# Patient Record
Sex: MALE | Race: ASIAN | NOT HISPANIC OR LATINO
[De-identification: names, ages, dates, MRNs, and addresses within clinical notes are randomized per-mention and may not be internally consistent; named-entity substitution may affect disease eponyms.]

---

## 2022-05-31 ENCOUNTER — NON-APPOINTMENT (OUTPATIENT)
Age: 72
End: 2022-05-31

## 2022-05-31 ENCOUNTER — APPOINTMENT (OUTPATIENT)
Dept: HEART AND VASCULAR | Facility: CLINIC | Age: 72
End: 2022-05-31
Payer: MEDICARE

## 2022-05-31 VITALS
HEIGHT: 63.5 IN | DIASTOLIC BLOOD PRESSURE: 70 MMHG | SYSTOLIC BLOOD PRESSURE: 124 MMHG | TEMPERATURE: 97.4 F | BODY MASS INDEX: 21.58 KG/M2 | OXYGEN SATURATION: 95 % | HEART RATE: 85 BPM | WEIGHT: 123.31 LBS

## 2022-05-31 DIAGNOSIS — E11.9 TYPE 2 DIABETES MELLITUS W/OUT COMPLICATIONS: ICD-10-CM

## 2022-05-31 DIAGNOSIS — Z79.4 TYPE 2 DIABETES MELLITUS W/OUT COMPLICATIONS: ICD-10-CM

## 2022-05-31 DIAGNOSIS — R94.39 ABNORMAL RESULT OF OTHER CARDIOVASCULAR FUNCTION STUDY: ICD-10-CM

## 2022-05-31 DIAGNOSIS — I25.10 ATHEROSCLEROTIC HEART DISEASE OF NATIVE CORONARY ARTERY W/OUT ANGINA PECTORIS: ICD-10-CM

## 2022-05-31 DIAGNOSIS — E78.5 HYPERLIPIDEMIA, UNSPECIFIED: ICD-10-CM

## 2022-05-31 DIAGNOSIS — I10 ESSENTIAL (PRIMARY) HYPERTENSION: ICD-10-CM

## 2022-05-31 PROBLEM — Z00.00 ENCOUNTER FOR PREVENTIVE HEALTH EXAMINATION: Status: ACTIVE | Noted: 2022-05-31

## 2022-05-31 PROCEDURE — 93000 ELECTROCARDIOGRAM COMPLETE: CPT

## 2022-05-31 PROCEDURE — 36415 COLL VENOUS BLD VENIPUNCTURE: CPT

## 2022-05-31 RX ORDER — ATORVASTATIN CALCIUM 80 MG/1
80 TABLET, FILM COATED ORAL
Refills: 0 | Status: ACTIVE | COMMUNITY
Start: 2022-03-02

## 2022-05-31 RX ORDER — METFORMIN HYDROCHLORIDE 1000 MG/1
1000 TABLET, COATED ORAL
Qty: 180 | Refills: 0 | Status: ACTIVE | COMMUNITY
Start: 2022-03-02

## 2022-05-31 RX ORDER — EMPAGLIFLOZIN 25 MG/1
25 TABLET, FILM COATED ORAL
Qty: 90 | Refills: 0 | Status: ACTIVE | COMMUNITY
Start: 2022-03-02

## 2022-05-31 RX ORDER — ISOSORBIDE MONONITRATE 30 MG/1
30 TABLET, EXTENDED RELEASE ORAL
Qty: 90 | Refills: 0 | Status: ACTIVE | COMMUNITY
Start: 2022-03-10

## 2022-05-31 RX ORDER — EZETIMIBE 10 MG/1
10 TABLET ORAL
Qty: 90 | Refills: 3 | Status: ACTIVE | COMMUNITY
Start: 2022-05-31 | End: 1900-01-01

## 2022-05-31 RX ORDER — CLOPIDOGREL BISULFATE 75 MG/1
75 TABLET, FILM COATED ORAL
Qty: 90 | Refills: 0 | Status: ACTIVE | COMMUNITY
Start: 2022-03-02

## 2022-05-31 RX ORDER — OLMESARTAN MEDOXOMIL 40 MG/1
40 TABLET, FILM COATED ORAL
Refills: 0 | Status: ACTIVE | COMMUNITY

## 2022-05-31 RX ORDER — PEN NEEDLE, DIABETIC 29 G X1/2"
32G X 4 MM NEEDLE, DISPOSABLE MISCELLANEOUS
Qty: 360 | Refills: 0 | Status: ACTIVE | COMMUNITY
Start: 2021-12-20

## 2022-05-31 RX ORDER — INSULIN LISPRO 100 [IU]/ML
100 INJECTION, SOLUTION INTRAVENOUS; SUBCUTANEOUS
Refills: 0 | Status: ACTIVE | COMMUNITY

## 2022-05-31 RX ORDER — TRAVOPROST 0.04 MG/ML
0 SOLUTION OPHTHALMIC
Qty: 7 | Refills: 0 | Status: ACTIVE | COMMUNITY
Start: 2021-12-17

## 2022-05-31 RX ORDER — INSULIN GLARGINE 300 U/ML
300 INJECTION, SOLUTION SUBCUTANEOUS
Qty: 4 | Refills: 0 | Status: ACTIVE | COMMUNITY
Start: 2021-12-20

## 2022-05-31 RX ORDER — CARVEDILOL 25 MG/1
25 TABLET, FILM COATED ORAL TWICE DAILY
Qty: 90 | Refills: 2 | Status: ACTIVE | COMMUNITY

## 2022-05-31 RX ORDER — FENOFIBRIC ACID 135 MG/1
135 CAPSULE, DELAYED RELEASE ORAL
Qty: 90 | Refills: 0 | Status: ACTIVE | COMMUNITY
Start: 2021-11-17

## 2022-05-31 RX ORDER — BLOOD SUGAR DIAGNOSTIC
STRIP MISCELLANEOUS
Qty: 400 | Refills: 0 | Status: ACTIVE | COMMUNITY
Start: 2021-12-20

## 2022-05-31 NOTE — PHYSICAL EXAM
[Normal] : soft, non-tender, no masses/organomegaly, normal bowel sounds [No Edema] : no edema [Moves all extremities] : moves all extremities

## 2022-06-01 NOTE — HISTORY OF PRESENT ILLNESS
[FreeTextEntry1] : 71M w/ CAD (multivessel small vessl dz,  PDA), DM2 (dx age 40), HTN (but labile), mixed dyslipidemia with here for cardiovascular consult\par \par His primary residence is in [place] but now has been in [facility] since. Was seeing Dr Contreras at [facility]. His son in law in a patient of Dr Elam. \par \par He had a positive stress test in March 2022 and he was recommended medical management for 3 months and lifestyle management and then reassess if angiogram is indicated. He was started on isosorbide. \par He has a history of inferior ischemia noted on nuclear stress going back to 2006 at least. \par \par He walks 4-5000 steps a day. He is able to do 1 flight of stairs without dyspnea or chest pain. If he walks 4 flights he has some discomfort with breathing -- but he wears a N95 mask. He has been hesitant with more exercise due to concern about crowds and COVID. \par Patient denies any chest pain, SOB, palpitations, orthopnea, PND or LE edema.\par \par \par He does report lightheadedness on occasion, usually soon after taking his medications. But this is not frequent or bothersome. There is no associated syncope. It may occur with walking, and with change in position. \par \par Social Hx: Never smoker, no alcohol\par Family Hx: No premature ASCVD in first degree relative, no known sudden cardiac death\par \par Lifestyle History:\par Mediterranean Diet Score (9 question survey) was 4. \par (8-9: optimal, 6-7: near-optimal, 4-5: suboptimal, 0-3: markedly suboptimal)\par Exercise: Patient reports exercising at a moderate level for 120 minutes per week. \par Smoking: Patient denies any history of smoking. \par Stress: Moderate  stress. \par Sleep apnea: Some fatigue but no other features\par \par ======Data=====\par Labs 12/2021: Cr 0.87, K 4.4, LFt wnl. \par 5/2021: Tchol 147, , HDL 42, LDL 84, \par 3/2022: A1c 8%\par \par \par ECHO 3/2022: Nl EF 60%, Nl LV diastolic fnx, Nl RV size and fx, aortic sclerosis. nl aorta.\par \par \par Stress: Had SPECT/nuclear stress March 2022 which had positive stress ECG with 2mm ST segment depression in V4-V6. EF 73%. Achieved 10 METs and stopped for fatigue he had perfusion defects in the apical inferior, apex (mild) and mid inferior walls (moderate)\par \par Cath 2014 ([facility]): LM nl, LAD moderate dx good flow, D!D# small vessels, LCx - OM1 with 60-70% lesion and small vessel,  PDA with collateral\par \par

## 2022-06-01 NOTE — REASON FOR VISIT
[CV Risk Factors and Non-Cardiac Disease] : CV risk factors and non-cardiac disease [Hyperlipidemia] : hyperlipidemia [Hypertension] : hypertension [Coronary Artery Disease] : coronary artery disease [Symptom and Test Evaluation] : symptom and test evaluation [FreeTextEntry3] : Dr Bridget Booth, Dr Demond Room ([facility])

## 2022-06-02 LAB
ALBUMIN SERPL ELPH-MCNC: 4.7 G/DL
ALP BLD-CCNC: 37 U/L
ALT SERPL-CCNC: 14 U/L
ANION GAP SERPL CALC-SCNC: 13 MMOL/L
AST SERPL-CCNC: 20 U/L
BILIRUB SERPL-MCNC: 0.4 MG/DL
BUN SERPL-MCNC: 27 MG/DL
CALCIUM SERPL-MCNC: 9.8 MG/DL
CHLORIDE SERPL-SCNC: 100 MMOL/L
CHOLEST SERPL-MCNC: 151 MG/DL
CO2 SERPL-SCNC: 24 MMOL/L
CREAT SERPL-MCNC: 0.93 MG/DL
EGFR: 87 ML/MIN/1.73M2
GLUCOSE SERPL-MCNC: 117 MG/DL
HDLC SERPL-MCNC: 51 MG/DL
LDLC SERPL CALC-MCNC: 81 MG/DL
NONHDLC SERPL-MCNC: 100 MG/DL
POTASSIUM SERPL-SCNC: 4.5 MMOL/L
PROT SERPL-MCNC: 7.4 G/DL
SODIUM SERPL-SCNC: 137 MMOL/L
TRIGL SERPL-MCNC: 96 MG/DL

## 2022-06-08 LAB
BASOPHILS # BLD AUTO: 0.04 K/UL
BASOPHILS NFR BLD AUTO: 0.8 %
EOSINOPHIL # BLD AUTO: 0.12 K/UL
EOSINOPHIL NFR BLD AUTO: 2.3 %
HCT VFR BLD CALC: 40.6 %
HGB BLD-MCNC: 13.1 G/DL
IMM GRANULOCYTES NFR BLD AUTO: 0.2 %
LYMPHOCYTES # BLD AUTO: 1.35 K/UL
LYMPHOCYTES NFR BLD AUTO: 26.1 %
MAN DIFF?: NORMAL
MCHC RBC-ENTMCNC: 28.7 PG
MCHC RBC-ENTMCNC: 32.3 GM/DL
MCV RBC AUTO: 88.8 FL
MONOCYTES # BLD AUTO: 0.36 K/UL
MONOCYTES NFR BLD AUTO: 7 %
NEUTROPHILS # BLD AUTO: 3.29 K/UL
NEUTROPHILS NFR BLD AUTO: 63.6 %
PLATELET # BLD AUTO: 234 K/UL
RBC # BLD: 4.57 M/UL
RBC # FLD: 14.6 %
WBC # FLD AUTO: 5.17 K/UL

## 2023-12-14 NOTE — H&P ADULT - HISTORY OF PRESENT ILLNESS
Cardiologist: Dr. Degroot  Pharmacy:   Escort:    72 y/o M w/ PMH HTN, HLD, DM, mild b/l carotid stenosis who initially presented to see his cardiologist Dr. Degroot with CC of substernal chest pain, non-radiating describes it as dull ache occurring intermittently on ambulating 2 blocks or climbing 1 flight of stairs, Pt. also endorses mild dizziness. Pt. denies any ___ SOB, palpitations, N/V, LE edema, PND/orthopnea and syncope. NST 11/16/23 : moderate amount of ischemia in apical location, EF 56 %. ECHO 11/4/23 revealed EF 68 %, AV calcification, trace TR. In light of patients risk factors, CCS class __ sx and abnormal NST, pt. is now referred for cardiac cath with possible intervention.  Cardiologist: Dr. Degroot  Pharmacy:   Escort:    74 y/o M w/ PMH HTN, HLD, DM, mild b/l carotid stenosis who initially presented to see his cardiologist Dr. Degroot with CC of substernal chest pain, non-radiating describes it as dull ache occurring intermittently on ambulating 2 blocks or climbing 1 flight of stairs, Pt. also endorses mild dizziness. Pt. denies any ___ SOB, palpitations, N/V, LE edema, PND/orthopnea and syncope. NST 11/16/23 : moderate amount of ischemia in apical location, EF 56 %. ECHO 11/4/23 revealed EF 68 %, AV calcification, trace TR. In light of patients risk factors, CCS class __ sx and abnormal NST, pt. is now referred for cardiac cath with possible intervention.

## 2024-02-01 VITALS — HEIGHT: 63 IN | WEIGHT: 121.92 LBS

## 2024-02-01 RX ORDER — SODIUM CHLORIDE 9 MG/ML
1000 INJECTION, SOLUTION INTRAVENOUS
Refills: 0 | Status: DISCONTINUED | OUTPATIENT
Start: 2024-02-07 | End: 2024-02-21

## 2024-02-01 RX ORDER — DEXTROSE 50 % IN WATER 50 %
15 SYRINGE (ML) INTRAVENOUS ONCE
Refills: 0 | Status: DISCONTINUED | OUTPATIENT
Start: 2024-02-07 | End: 2024-02-21

## 2024-02-01 RX ORDER — DEXTROSE 50 % IN WATER 50 %
25 SYRINGE (ML) INTRAVENOUS ONCE
Refills: 0 | Status: DISCONTINUED | OUTPATIENT
Start: 2024-02-07 | End: 2024-02-21

## 2024-02-01 RX ORDER — CHLORHEXIDINE GLUCONATE 213 G/1000ML
1 SOLUTION TOPICAL ONCE
Refills: 0 | Status: DISCONTINUED | OUTPATIENT
Start: 2024-02-07 | End: 2024-02-21

## 2024-02-01 RX ORDER — SODIUM CHLORIDE 9 MG/ML
1000 INJECTION INTRAMUSCULAR; INTRAVENOUS; SUBCUTANEOUS
Refills: 0 | Status: DISCONTINUED | OUTPATIENT
Start: 2024-02-07 | End: 2024-02-21

## 2024-02-01 RX ORDER — GLUCAGON INJECTION, SOLUTION 0.5 MG/.1ML
1 INJECTION, SOLUTION SUBCUTANEOUS ONCE
Refills: 0 | Status: DISCONTINUED | OUTPATIENT
Start: 2024-02-07 | End: 2024-02-21

## 2024-02-01 RX ORDER — DEXTROSE 50 % IN WATER 50 %
12.5 SYRINGE (ML) INTRAVENOUS ONCE
Refills: 0 | Status: DISCONTINUED | OUTPATIENT
Start: 2024-02-07 | End: 2024-02-21

## 2024-02-01 RX ORDER — INSULIN LISPRO 100/ML
VIAL (ML) SUBCUTANEOUS
Refills: 0 | Status: DISCONTINUED | OUTPATIENT
Start: 2024-02-07 | End: 2024-02-21

## 2024-02-01 NOTE — H&P ADULT - NSVTERISKREFERASSESS_GEN_ALL_CORE
Monitor on telemetry for now    - Daily weights, strict I/O's  - Check BNP in 48 hours   - s/p Lasix 20mg IVP x1    - TTE ordered: EF 25% Refer to the Assessment tab to view/cancel completed assessment.

## 2024-02-01 NOTE — H&P ADULT - NSHPLABSRESULTS_GEN_ALL_CORE
LABS:                          14.7   4.78  )-----------( 208      ( 07 Feb 2024 08:47 )             44.8     02-07    139  |  101  |  28<H>  ----------------------------<  105<H>  3.8   |  24  |  0.79    Ca    9.3      07 Feb 2024 09:23  Mg     2.1     02-07    TPro  7.5  /  Alb  4.7  /  TBili  0.6  /  DBili  x   /  AST  35  /  ALT  34  /  AlkPhos  53  02-07    LIVER FUNCTIONS - ( 07 Feb 2024 09:23 )  Alb: 4.7 g/dL / Pro: 7.5 g/dL / ALK PHOS: 53 U/L / ALT: 34 U/L / AST: 35 U/L / GGT: x           PT/INR - ( 07 Feb 2024 08:47 )   PT: 11.0 sec;   INR: 0.96          PTT - ( 07 Feb 2024 08:47 )  PTT:38.6 sec  Urinalysis Basic - ( 07 Feb 2024 09:23 )    Color: x / Appearance: x / SG: x / pH: x  Gluc: 105 mg/dL / Ketone: x  / Bili: x / Urobili: x   Blood: x / Protein: x / Nitrite: x   Leuk Esterase: x / RBC: x / WBC x   Sq Epi: x / Non Sq Epi: x / Bacteria: x

## 2024-02-01 NOTE — H&P ADULT - HISTORY OF PRESENT ILLNESS
Cardiologist: Dr. Glory Degroot;  per [facility] post C ppwk pt was advised to f/up with Dr. Jake Contreras ([phone])   Pharmacy:   Escort:    72 y/o M w/ PMH HTN, HLD, DM, mild b/l carotid stenosis who initially presented to see his cardiologist Dr. Degroot with CC of substernal chest pain, non-radiating describes it as dull ache, associated with LITTLE/SOB, occurring intermittently on ambulating 2 blocks or climbing 1 flight of stairs, Pt. also endorses mild dizziness.  Pt recently underwent diagnostic LHC at [facility] and was found to have total occlusion of RPDA and mLAD (full report below); pt was advised on medical therapy, with options of PCI or bypass.   Pt _______ chest pain, SOB, LITTLE,  palpitations, diaphoresis, orthopnea/PND, cough, leg swelling, dizziness, LOC, fall, syncope, N/V/D, fever/chills/sick contact, rash, hematuria, BRBPR, melena/hematochezia, PMHx bleeding or clotting disorder.    LHC at [facility] 12/13/23: RCA mild diffuse dz; RPDA , small size, fills via collaterals from LAD; RPL1 Severe diffuse dz;   LM moderately calcified but no obtruction;  LAD: prox- mild diffuse; mLAD ; D1, D2, D3: mild diffuse; LCx mild diffuse; OM1 moderate diffuse disease, small size.  LVEDP normal. Rt radial access.   NST 11/16/23: moderate ischemia in apical location which is reversible   TTE 11/4/23: revealed EF 68 %, AV calcification, trace TR.     In light of patient's risk factors, abnormal NST and LHC results, and CCS class ____ anginal/anginal-equivalent symptoms, patient is referred to [facility] for cardiac catheterization w/ possible intervention if clinically indicated.   Cardiologist: Dr. Glory Degroot;  per [facility] post C ppwk pt was advised to f/up with Dr. Jake Contreras ([phone])   Pharmacy:   Escort:    72 y/o M w/ PMH HTN, HLD, DM-2, mild b/l carotid stenosis who initially presented to see his cardiologist Dr. Degroot with CC of substernal chest pain, non-radiating describes it as dull ache, associated with LITTLE/SOB, occurring intermittently on ambulating 2 blocks or climbing 1 flight of stairs, Pt. also endorses mild dizziness.  Pt recently underwent diagnostic LHC at [facility] and was found to have total occlusion of RPDA and mLAD (full report below); pt was advised on medical therapy, with options of PCI or bypass.   Pt _______ chest pain, SOB, LITTLE,  palpitations, diaphoresis, orthopnea/PND, cough, leg swelling, dizziness, LOC, fall, syncope, N/V/D, fever/chills/sick contact, rash, hematuria, BRBPR, melena/hematochezia, PMHx bleeding or clotting disorder.    LHC at [facility] 12/13/23: RCA mild diffuse dz; RPDA , small size, fills via collaterals from LAD; RPL1 Severe diffuse dz;   LM moderately calcified but no obtruction;  LAD: prox- mild diffuse; mLAD ; D1, D2, D3: mild diffuse; LCx mild diffuse; OM1 moderate diffuse disease, small size.  LVEDP normal. Rt radial access.   NST 11/16/23: moderate ischemia in apical location which is reversible   TTE 11/4/23: revealed EF 68 %, AV calcification, trace TR.     In light of patient's risk factors, abnormal NST and LHC results, and CCS class ____ anginal/anginal-equivalent symptoms, patient is referred to [facility] for cardiac catheterization w/ possible intervention if clinically indicated.   Cardiologist: Dr. Glory Degroot, Dr Marciano Ayala  Pharmacy: [facility]  Escort: daughter Mirella    74 y/o M w/ PMH HTN, HLD, DM-2, mild b/l carotid stenosis who initially presented to see his cardiologist Dr. Degroot with CC of substernal chest pain, non-radiating describes it as dull ache, associated with LITTLE/SOB, occurring intermittently on ambulating 2 blocks or climbing 1 flight of stairs, Pt. also endorses mild dizziness.  Pt recently underwent diagnostic LHC at [facility] and was found to have total occlusion of RPDA and mLAD (full report below); pt was advised on medical therapy, with options of PCI or bypass, and chose PCI.    Pt otherwise denies chest pain, SOB, LITTLE,  palpitations, diaphoresis, orthopnea/PND, cough, leg swelling, dizziness, LOC, fall, syncope, N/V/D, fever/chills/sick contact, rash, hematuria, BRBPR, melena/hematochezia, PMHx bleeding or clotting disorder.    LHC at [facility] 12/13/23: RCA mild diffuse dz; RPDA , small size, fills via collaterals from LAD; RPL1 Severe diffuse dz;   LM moderately calcified but no obtruction;  LAD: prox- mild diffuse; mLAD ; D1, D2, D3: mild diffuse; LCx mild diffuse; OM1 moderate diffuse disease, small size.  LVEDP normal. Rt radial access.   NST 11/16/23: moderate ischemia in apical location which is reversible   TTE 11/4/23: revealed EF 68 %, AV calcification, trace TR.     In light of patient's risk factors, abnormal NST and LHC results, and CCS class III anginal symptoms, patient is referred to [facility] for cardiac catheterization w/ possible intervention if clinically indicated.

## 2024-02-01 NOTE — H&P ADULT - ASSESSMENT
72 y/o M w/ PMH HTN, HLD, DM-2, mild b/l carotid stenosis who initially presented to see his cardiologist Dr. Degroot with CC of substernal chest pain, non-radiating describes it as dull ache, associated with LITTLE/SOB, occurring intermittently on ambulating 2 blocks or climbing 1 flight of stairs, Pt. also endorses mild dizziness.  Pt recently underwent diagnostic LHC at [facility] and was found to have total occlusion of RPDA and mLAD (full report below); pt was advised on medical therapy, with options of PCI or bypass, and chose PCI. In light of patient's risk factors, abnormal NST and LHC results, and CCS class III anginal symptoms, patient is referred to [facility] for cardiac catheterization w/ possible intervention if clinically indicated.    - ASA III Mallampati II  - VSS  - Patient is a candidate for moderate sedation  - Labs reviewed by PA - _________  - GFR/Cr ____  - EKG - NSR 85 bpm, no Qw/ST changes/TWI  - EF - 68% per TTE  - LOAD - missed 2 doses of ASA and Plavix, ordered for  mg and Plavix 150 mg  - FLUIDS -  ml bolus given x1; NS 75 ml/hr given x 2hr    Risks & benefits of procedure and alternative therapy have been explained to the patient including but not limited to: allergic reaction, bleeding w/possible need for blood transfusion, infection, renal and vascular compromise, limb damage, arrhythmia, stroke, vessel dissection/perforation, Myocardial infarction, emergent CABG. Informed consent obtained and in chart.    72 y/o M w/ PMH HTN, HLD, DM-2, mild b/l carotid stenosis who initially presented to see his cardiologist Dr. Degroot with CC of substernal chest pain, non-radiating describes it as dull ache, associated with LITTLE/SOB, occurring intermittently on ambulating 2 blocks or climbing 1 flight of stairs, Pt. also endorses mild dizziness.  Pt recently underwent diagnostic LHC at [facility] and was found to have total occlusion of RPDA and mLAD (full report below); pt was advised on medical therapy, with options of PCI or bypass, and chose PCI. In light of patient's risk factors, abnormal NST and LHC results, and CCS class III anginal symptoms, patient is referred to [facility] for cardiac catheterization w/ possible intervention if clinically indicated.    - ASA III Mallampati II  - VSS  - Patient is a candidate for moderate sedation  - Labs reviewed by PA - BUN:Cr ratio elevated, patient likely dehydrated i/s/o NPO; given 250 cc bolus and 75 cc x2 hrs, plan to further hydrate post cath  - GFR/Cr - 93/0.79  - EKG - NSR 85 bpm, no Qw/ST changes/TWI  - EF - 68% per TTE  - LOAD - missed 2 doses of ASA and Plavix, ordered for  mg and Plavix 150 mg  - FLUIDS -  ml bolus given x1; NS 75 ml/hr given x 2hr    Risks & benefits of procedure and alternative therapy have been explained to the patient including but not limited to: allergic reaction, bleeding w/possible need for blood transfusion, infection, renal and vascular compromise, limb damage, arrhythmia, stroke, vessel dissection/perforation, Myocardial infarction, emergent CABG. Informed consent obtained and in chart.

## 2024-02-07 ENCOUNTER — OUTPATIENT (OUTPATIENT)
Dept: OUTPATIENT SERVICES | Facility: HOSPITAL | Age: 74
LOS: 1 days | Discharge: HOME | End: 2024-02-07
Payer: MEDICARE

## 2024-02-07 LAB
A1C WITH ESTIMATED AVERAGE GLUCOSE RESULT: 6.9 % — HIGH (ref 4–5.6)
ALBUMIN SERPL ELPH-MCNC: 4.7 G/DL — SIGNIFICANT CHANGE UP (ref 3.3–5)
ALP SERPL-CCNC: 53 U/L — SIGNIFICANT CHANGE UP (ref 40–120)
ALT FLD-CCNC: 34 U/L — SIGNIFICANT CHANGE UP (ref 10–45)
ANION GAP SERPL CALC-SCNC: 10 MMOL/L — SIGNIFICANT CHANGE UP (ref 5–17)
ANION GAP SERPL CALC-SCNC: 14 MMOL/L — SIGNIFICANT CHANGE UP (ref 5–17)
APTT BLD: 38.6 SEC — HIGH (ref 24.5–35.6)
AST SERPL-CCNC: 35 U/L — SIGNIFICANT CHANGE UP (ref 10–40)
BASOPHILS # BLD AUTO: 0.06 K/UL — SIGNIFICANT CHANGE UP (ref 0–0.2)
BASOPHILS NFR BLD AUTO: 1.3 % — SIGNIFICANT CHANGE UP (ref 0–2)
BILIRUB SERPL-MCNC: 0.6 MG/DL — SIGNIFICANT CHANGE UP (ref 0.2–1.2)
BUN SERPL-MCNC: 23 MG/DL — SIGNIFICANT CHANGE UP (ref 7–23)
BUN SERPL-MCNC: 28 MG/DL — HIGH (ref 7–23)
CALCIUM SERPL-MCNC: 8.5 MG/DL — SIGNIFICANT CHANGE UP (ref 8.4–10.5)
CALCIUM SERPL-MCNC: 9.3 MG/DL — SIGNIFICANT CHANGE UP (ref 8.4–10.5)
CHLORIDE SERPL-SCNC: 101 MMOL/L — SIGNIFICANT CHANGE UP (ref 96–108)
CHLORIDE SERPL-SCNC: 103 MMOL/L — SIGNIFICANT CHANGE UP (ref 96–108)
CHOLEST SERPL-MCNC: 126 MG/DL — SIGNIFICANT CHANGE UP
CK MB CFR SERPL CALC: 3.4 NG/ML — SIGNIFICANT CHANGE UP (ref 0–6.7)
CK SERPL-CCNC: 157 U/L — SIGNIFICANT CHANGE UP (ref 30–200)
CO2 SERPL-SCNC: 24 MMOL/L — SIGNIFICANT CHANGE UP (ref 22–31)
CO2 SERPL-SCNC: 24 MMOL/L — SIGNIFICANT CHANGE UP (ref 22–31)
CREAT SERPL-MCNC: 0.79 MG/DL — SIGNIFICANT CHANGE UP (ref 0.5–1.3)
CREAT SERPL-MCNC: 0.81 MG/DL — SIGNIFICANT CHANGE UP (ref 0.5–1.3)
EGFR: 92 ML/MIN/1.73M2 — SIGNIFICANT CHANGE UP
EGFR: 93 ML/MIN/1.73M2 — SIGNIFICANT CHANGE UP
EOSINOPHIL # BLD AUTO: 0.48 K/UL — SIGNIFICANT CHANGE UP (ref 0–0.5)
EOSINOPHIL NFR BLD AUTO: 10 % — HIGH (ref 0–6)
ESTIMATED AVERAGE GLUCOSE: 151 MG/DL — HIGH (ref 68–114)
GLUCOSE BLDC GLUCOMTR-MCNC: 73 MG/DL — SIGNIFICANT CHANGE UP (ref 70–99)
GLUCOSE BLDC GLUCOMTR-MCNC: 87 MG/DL — SIGNIFICANT CHANGE UP (ref 70–99)
GLUCOSE SERPL-MCNC: 105 MG/DL — HIGH (ref 70–99)
GLUCOSE SERPL-MCNC: 260 MG/DL — HIGH (ref 70–99)
HCT VFR BLD CALC: 39.7 % — SIGNIFICANT CHANGE UP (ref 39–50)
HCT VFR BLD CALC: 44.8 % — SIGNIFICANT CHANGE UP (ref 39–50)
HDLC SERPL-MCNC: 43 MG/DL — SIGNIFICANT CHANGE UP
HGB BLD-MCNC: 13.1 G/DL — SIGNIFICANT CHANGE UP (ref 13–17)
HGB BLD-MCNC: 14.7 G/DL — SIGNIFICANT CHANGE UP (ref 13–17)
IMM GRANULOCYTES NFR BLD AUTO: 0.2 % — SIGNIFICANT CHANGE UP (ref 0–0.9)
INR BLD: 0.96 — SIGNIFICANT CHANGE UP (ref 0.85–1.18)
ISTAT ACTK (ACTIVATED CLOTTING TIME KAOLIN): 277 SEC — HIGH (ref 74–137)
ISTAT ACTK (ACTIVATED CLOTTING TIME KAOLIN): 282 SEC — HIGH (ref 74–137)
LIPID PNL WITH DIRECT LDL SERPL: 67 MG/DL — SIGNIFICANT CHANGE UP
LYMPHOCYTES # BLD AUTO: 1.17 K/UL — SIGNIFICANT CHANGE UP (ref 1–3.3)
LYMPHOCYTES # BLD AUTO: 24.5 % — SIGNIFICANT CHANGE UP (ref 13–44)
MAGNESIUM SERPL-MCNC: 2 MG/DL — SIGNIFICANT CHANGE UP (ref 1.6–2.6)
MAGNESIUM SERPL-MCNC: 2.1 MG/DL — SIGNIFICANT CHANGE UP (ref 1.6–2.6)
MCHC RBC-ENTMCNC: 28.5 PG — SIGNIFICANT CHANGE UP (ref 27–34)
MCHC RBC-ENTMCNC: 29.2 PG — SIGNIFICANT CHANGE UP (ref 27–34)
MCHC RBC-ENTMCNC: 32.8 GM/DL — SIGNIFICANT CHANGE UP (ref 32–36)
MCHC RBC-ENTMCNC: 33 GM/DL — SIGNIFICANT CHANGE UP (ref 32–36)
MCV RBC AUTO: 86.8 FL — SIGNIFICANT CHANGE UP (ref 80–100)
MCV RBC AUTO: 88.4 FL — SIGNIFICANT CHANGE UP (ref 80–100)
MONOCYTES # BLD AUTO: 0.39 K/UL — SIGNIFICANT CHANGE UP (ref 0–0.9)
MONOCYTES NFR BLD AUTO: 8.2 % — SIGNIFICANT CHANGE UP (ref 2–14)
NEUTROPHILS # BLD AUTO: 2.67 K/UL — SIGNIFICANT CHANGE UP (ref 1.8–7.4)
NEUTROPHILS NFR BLD AUTO: 55.8 % — SIGNIFICANT CHANGE UP (ref 43–77)
NON HDL CHOLESTEROL: 83 MG/DL — SIGNIFICANT CHANGE UP
NRBC # BLD: 0 /100 WBCS — SIGNIFICANT CHANGE UP (ref 0–0)
NRBC # BLD: 0 /100 WBCS — SIGNIFICANT CHANGE UP (ref 0–0)
PLATELET # BLD AUTO: 168 K/UL — SIGNIFICANT CHANGE UP (ref 150–400)
PLATELET # BLD AUTO: 208 K/UL — SIGNIFICANT CHANGE UP (ref 150–400)
POTASSIUM SERPL-MCNC: 3.8 MMOL/L — SIGNIFICANT CHANGE UP (ref 3.5–5.3)
POTASSIUM SERPL-MCNC: 4.1 MMOL/L — SIGNIFICANT CHANGE UP (ref 3.5–5.3)
POTASSIUM SERPL-SCNC: 3.8 MMOL/L — SIGNIFICANT CHANGE UP (ref 3.5–5.3)
POTASSIUM SERPL-SCNC: 4.1 MMOL/L — SIGNIFICANT CHANGE UP (ref 3.5–5.3)
PROT SERPL-MCNC: 7.5 G/DL — SIGNIFICANT CHANGE UP (ref 6–8.3)
PROTHROM AB SERPL-ACNC: 11 SEC — SIGNIFICANT CHANGE UP (ref 9.5–13)
RBC # BLD: 4.49 M/UL — SIGNIFICANT CHANGE UP (ref 4.2–5.8)
RBC # BLD: 5.16 M/UL — SIGNIFICANT CHANGE UP (ref 4.2–5.8)
RBC # FLD: 14 % — SIGNIFICANT CHANGE UP (ref 10.3–14.5)
RBC # FLD: 14.2 % — SIGNIFICANT CHANGE UP (ref 10.3–14.5)
SODIUM SERPL-SCNC: 137 MMOL/L — SIGNIFICANT CHANGE UP (ref 135–145)
SODIUM SERPL-SCNC: 139 MMOL/L — SIGNIFICANT CHANGE UP (ref 135–145)
TRIGL SERPL-MCNC: 78 MG/DL — SIGNIFICANT CHANGE UP
WBC # BLD: 3.81 K/UL — SIGNIFICANT CHANGE UP (ref 3.8–10.5)
WBC # BLD: 4.78 K/UL — SIGNIFICANT CHANGE UP (ref 3.8–10.5)
WBC # FLD AUTO: 3.81 K/UL — SIGNIFICANT CHANGE UP (ref 3.8–10.5)
WBC # FLD AUTO: 4.78 K/UL — SIGNIFICANT CHANGE UP (ref 3.8–10.5)

## 2024-02-07 PROCEDURE — C1753: CPT

## 2024-02-07 PROCEDURE — 85025 COMPLETE CBC W/AUTO DIFF WBC: CPT

## 2024-02-07 PROCEDURE — 85347 COAGULATION TIME ACTIVATED: CPT

## 2024-02-07 PROCEDURE — C1894: CPT

## 2024-02-07 PROCEDURE — 83036 HEMOGLOBIN GLYCOSYLATED A1C: CPT

## 2024-02-07 PROCEDURE — 80048 BASIC METABOLIC PNL TOTAL CA: CPT

## 2024-02-07 PROCEDURE — 85610 PROTHROMBIN TIME: CPT

## 2024-02-07 PROCEDURE — 82962 GLUCOSE BLOOD TEST: CPT

## 2024-02-07 PROCEDURE — 93010 ELECTROCARDIOGRAM REPORT: CPT

## 2024-02-07 PROCEDURE — 83735 ASSAY OF MAGNESIUM: CPT

## 2024-02-07 PROCEDURE — 99152 MOD SED SAME PHYS/QHP 5/>YRS: CPT

## 2024-02-07 PROCEDURE — 80053 COMPREHEN METABOLIC PANEL: CPT

## 2024-02-07 PROCEDURE — 85730 THROMBOPLASTIN TIME PARTIAL: CPT

## 2024-02-07 PROCEDURE — C1769: CPT

## 2024-02-07 PROCEDURE — 93005 ELECTROCARDIOGRAM TRACING: CPT

## 2024-02-07 PROCEDURE — 80061 LIPID PANEL: CPT

## 2024-02-07 PROCEDURE — 82553 CREATINE MB FRACTION: CPT

## 2024-02-07 PROCEDURE — 92920 PRQ TRLUML C ANGIOP 1ART&/BR: CPT | Mod: LD

## 2024-02-07 PROCEDURE — C1725: CPT

## 2024-02-07 PROCEDURE — 82550 ASSAY OF CK (CPK): CPT

## 2024-02-07 PROCEDURE — 85027 COMPLETE CBC AUTOMATED: CPT

## 2024-02-07 PROCEDURE — C1887: CPT

## 2024-02-07 RX ORDER — METFORMIN HYDROCHLORIDE 850 MG/1
1 TABLET ORAL
Refills: 0 | DISCHARGE

## 2024-02-07 RX ORDER — ASPIRIN/CALCIUM CARB/MAGNESIUM 324 MG
81 TABLET ORAL ONCE
Refills: 0 | Status: DISCONTINUED | OUTPATIENT
Start: 2024-02-07 | End: 2024-02-07

## 2024-02-07 RX ORDER — CLOPIDOGREL BISULFATE 75 MG/1
1 TABLET, FILM COATED ORAL
Qty: 30 | Refills: 11
Start: 2024-02-07 | End: 2025-01-31

## 2024-02-07 RX ORDER — EZETIMIBE 10 MG/1
1 TABLET ORAL
Refills: 0 | DISCHARGE

## 2024-02-07 RX ORDER — NITROGLYCERIN 6.5 MG
1 CAPSULE, EXTENDED RELEASE ORAL
Refills: 0 | DISCHARGE

## 2024-02-07 RX ORDER — SODIUM CHLORIDE 9 MG/ML
250 INJECTION INTRAMUSCULAR; INTRAVENOUS; SUBCUTANEOUS ONCE
Refills: 0 | Status: COMPLETED | OUTPATIENT
Start: 2024-02-07 | End: 2024-02-07

## 2024-02-07 RX ORDER — ASPIRIN/CALCIUM CARB/MAGNESIUM 324 MG
1 TABLET ORAL
Refills: 0 | DISCHARGE

## 2024-02-07 RX ORDER — OLMESARTAN MEDOXOMIL 5 MG/1
1 TABLET, FILM COATED ORAL
Refills: 0 | DISCHARGE

## 2024-02-07 RX ORDER — INSULIN GLARGINE 100 [IU]/ML
19 INJECTION, SOLUTION SUBCUTANEOUS
Refills: 0 | DISCHARGE

## 2024-02-07 RX ORDER — EMPAGLIFLOZIN 10 MG/1
1 TABLET, FILM COATED ORAL
Refills: 0 | DISCHARGE

## 2024-02-07 RX ORDER — CARVEDILOL PHOSPHATE 80 MG/1
1 CAPSULE, EXTENDED RELEASE ORAL
Refills: 0 | DISCHARGE

## 2024-02-07 RX ORDER — ISOSORBIDE MONONITRATE 60 MG/1
1 TABLET, EXTENDED RELEASE ORAL
Refills: 0 | DISCHARGE

## 2024-02-07 RX ORDER — ASPIRIN/CALCIUM CARB/MAGNESIUM 324 MG
162 TABLET ORAL ONCE
Refills: 0 | Status: COMPLETED | OUTPATIENT
Start: 2024-02-07 | End: 2024-02-07

## 2024-02-07 RX ORDER — SODIUM CHLORIDE 9 MG/ML
1000 INJECTION INTRAMUSCULAR; INTRAVENOUS; SUBCUTANEOUS
Refills: 0 | Status: DISCONTINUED | OUTPATIENT
Start: 2024-02-07 | End: 2024-02-21

## 2024-02-07 RX ORDER — CLOPIDOGREL BISULFATE 75 MG/1
1 TABLET, FILM COATED ORAL
Refills: 0 | DISCHARGE

## 2024-02-07 RX ORDER — ASPIRIN/CALCIUM CARB/MAGNESIUM 324 MG
1 TABLET ORAL
Qty: 30 | Refills: 11
Start: 2024-02-07 | End: 2025-01-31

## 2024-02-07 RX ORDER — CLOPIDOGREL BISULFATE 75 MG/1
150 TABLET, FILM COATED ORAL ONCE
Refills: 0 | Status: COMPLETED | OUTPATIENT
Start: 2024-02-07 | End: 2024-02-07

## 2024-02-07 RX ORDER — INSULIN GLARGINE 100 [IU]/ML
0 INJECTION, SOLUTION SUBCUTANEOUS
Refills: 0 | DISCHARGE

## 2024-02-07 RX ORDER — ATORVASTATIN CALCIUM 80 MG/1
1 TABLET, FILM COATED ORAL
Refills: 0 | DISCHARGE

## 2024-02-07 RX ORDER — CLOPIDOGREL BISULFATE 75 MG/1
75 TABLET, FILM COATED ORAL ONCE
Refills: 0 | Status: DISCONTINUED | OUTPATIENT
Start: 2024-02-07 | End: 2024-02-07

## 2024-02-07 RX ADMIN — SODIUM CHLORIDE 75 MILLILITER(S): 9 INJECTION INTRAMUSCULAR; INTRAVENOUS; SUBCUTANEOUS at 09:42

## 2024-02-07 RX ADMIN — SODIUM CHLORIDE 165 MILLILITER(S): 9 INJECTION INTRAMUSCULAR; INTRAVENOUS; SUBCUTANEOUS at 12:41

## 2024-02-07 RX ADMIN — CLOPIDOGREL BISULFATE 150 MILLIGRAM(S): 75 TABLET, FILM COATED ORAL at 09:41

## 2024-02-07 RX ADMIN — Medication 162 MILLIGRAM(S): at 09:41

## 2024-02-07 RX ADMIN — SODIUM CHLORIDE 8.33 MILLILITER(S): 9 INJECTION INTRAMUSCULAR; INTRAVENOUS; SUBCUTANEOUS at 09:42

## 2024-02-07 NOTE — PROGRESS NOTE ADULT - SUBJECTIVE AND OBJECTIVE BOX
Interventional Cardiology PA Post PCI SDA Discharge Note    Patient without complaints. Ambulated and voided without difficulties    Ext: Right Radial: no hematoma, no bleeding, dressing; C/D/I       Pulses: intact RAD/DP/PT to baseline     A/P: 72 y/o M w/ PMH HTN, HLD, known CAD (s/p LH @ [facility] 12/2023 revealed  of LAD and RPDA), DM-2, mild b/l carotid stenosis, now presented to [facility] for recommended staged PCI 2/7/24 w/     1. Follow-up with PMD/Cardiologist ___ in 72 hours.  2. Post procedure labs/EKG reviewed and stable.    3. Pt given instructions on importance of taking antiplatelet medication.    4. Stable for discharge as per attending . ___ after bed rest, pt voids, wrist stable and 30 minutes of ambulation.  5. Prescriptions for Aspirin and ___ (Plavix/Brilinta/Effient) ___ e-prescribed and submitted to patient's pharmacy.  6. Patient will continue _______.  EDP 2 s/p 500cc, Scoreflex/PTCA mLAD , LM mild diffuse, pLAD 30-50%, LCx mild diffuse, pRCA 30%, RPDA  (R-R collaterals)  Interventional Cardiology PA Post PCI SDA Discharge Note    Patient without complaints. Ambulated and voided without difficulties    Ext: Right Radial: no hematoma, no bleeding, dressing; C/D/I       Pulses: intact RAD/DP/PT to baseline     A/P: 74 y/o M w/ PMH HTN, HLD, known CAD (s/p LH @ [facility] 12/2023 revealed  of LAD and RPDA), DM-2, mild b/l carotid stenosis, now presented to [facility] for recommended staged PCI 2/7/24 w/ Scoreflex/PTCA mLAD ; LM mild diffuse, pLAD 30-50%, LCx mild diffuse, pRCA 30%, RPDA  (R-R collaterals), EDP 2, access R radial.    1. Follow-up with PMD/Cardiologist Mikayla in 72 hours.  2. Post procedure labs/EKG reviewed and stable.    3. Pt given instructions on importance of taking antiplatelet medication.    4. Stable for discharge as per attending Dr. Dumont after bed rest, pt voids, wrist stable and 30 minutes of ambulation.  5. Prescriptions for Aspirin and Plavix e-prescribed and submitted to patient's pharmacy.  6. Patient will continue Coreg ER 20mg QD, Imdur 30mg QD, Benicar 20mg QD, Lipitor 40mg HS, Zetia 10mg QD, Jardiance 25mg QD, Lantus 19u HS and Metformin 1000mg BID   Interventional Cardiology PA Post PCI SDA Discharge Note    Patient without complaints. Ambulated and voided without difficulties    Ext: Right Radial: no hematoma, no bleeding, dressing; C/D/I       Pulses: intact RAD/DP/PT to baseline     A/P: 74 y/o M w/ PMH HTN, HLD, known CAD (s/p LH @ [facility] 12/2023 revealed  of LAD and RPDA), DM-2, mild b/l carotid stenosis, now presented to [facility] for recommended staged PCI 2/7/24 w/ Scoreflex/PTCA mLAD ; LM mild diffuse, pLAD 50%, LCx mild diffuse, pRCA 30%, RPDA  (R-R collaterals), EDP 2, access R radial.    1. Follow-up with PMD/Cardiologist Mikayla in 72 hours.  2. Post procedure labs/EKG reviewed and stable.    3. Pt given instructions on importance of taking antiplatelet medication.    4. Stable for discharge as per attending Dr. Dumont after bed rest, pt voids, wrist stable and 30 minutes of ambulation.  5. Prescriptions for Aspirin and Plavix e-prescribed and submitted to patient's pharmacy.  6. Patient will continue Coreg ER 20mg QD, Imdur 30mg QD, Benicar 20mg QD, Lipitor 40mg HS, Zetia 10mg QD, Jardiance 25mg QD, Lantus 19u HS and Metformin 1000mg BID

## 2024-02-15 DIAGNOSIS — R94.39 ABNORMAL RESULT OF OTHER CARDIOVASCULAR FUNCTION STUDY: ICD-10-CM

## 2024-02-15 DIAGNOSIS — I25.110 ATHEROSCLEROTIC HEART DISEASE OF NATIVE CORONARY ARTERY WITH UNSTABLE ANGINA PECTORIS: ICD-10-CM

## 2024-02-15 DIAGNOSIS — I25.82 CHRONIC TOTAL OCCLUSION OF CORONARY ARTERY: ICD-10-CM
